# Patient Record
Sex: FEMALE | Race: WHITE | NOT HISPANIC OR LATINO | Employment: FULL TIME | URBAN - METROPOLITAN AREA
[De-identification: names, ages, dates, MRNs, and addresses within clinical notes are randomized per-mention and may not be internally consistent; named-entity substitution may affect disease eponyms.]

---

## 2024-01-11 RX ORDER — BACLOFEN 20 MG/1
20 TABLET ORAL 3 TIMES DAILY
Qty: 90 TABLET | Refills: 2 | Status: SHIPPED | OUTPATIENT
Start: 2024-01-11

## 2024-02-29 ENCOUNTER — PATIENT MESSAGE (OUTPATIENT)
Dept: OBSTETRICS AND GYNECOLOGY | Facility: CLINIC | Age: 49
End: 2024-02-29
Payer: COMMERCIAL

## 2024-03-04 RX ORDER — ESTRADIOL 0.04 MG/D
1 FILM, EXTENDED RELEASE TRANSDERMAL
Qty: 12 PATCH | Refills: 1 | OUTPATIENT
Start: 2024-03-04

## 2024-03-21 DIAGNOSIS — G47.00 INSOMNIA, UNSPECIFIED TYPE: ICD-10-CM

## 2024-03-21 NOTE — TELEPHONE ENCOUNTER
No care due was identified.  [facility] Embedded Care Due Messages. Reference number: [id number].   3/21/2024 5:08:28 PM CDT

## 2024-03-22 ENCOUNTER — PATIENT MESSAGE (OUTPATIENT)
Dept: FAMILY MEDICINE | Facility: CLINIC | Age: 49
End: 2024-03-22
Payer: COMMERCIAL

## 2024-03-22 ENCOUNTER — PATIENT MESSAGE (OUTPATIENT)
Dept: ADMINISTRATIVE | Facility: HOSPITAL | Age: 49
End: 2024-03-22
Payer: COMMERCIAL

## 2024-03-22 ENCOUNTER — PATIENT OUTREACH (OUTPATIENT)
Dept: ADMINISTRATIVE | Facility: HOSPITAL | Age: 49
End: 2024-03-22
Payer: COMMERCIAL

## 2024-03-22 RX ORDER — ZOLPIDEM TARTRATE 5 MG/1
5 TABLET ORAL NIGHTLY PRN
Qty: 30 TABLET | Refills: 2 | Status: SHIPPED | OUTPATIENT
Start: 2024-03-22 | End: 2024-05-21

## 2024-04-04 ENCOUNTER — PATIENT OUTREACH (OUTPATIENT)
Dept: ADMINISTRATIVE | Facility: HOSPITAL | Age: 49
End: 2024-04-04
Payer: COMMERCIAL

## 2024-04-04 DIAGNOSIS — Z12.11 SCREENING FOR COLORECTAL CANCER: Primary | ICD-10-CM

## 2024-04-04 DIAGNOSIS — Z12.12 SCREENING FOR COLORECTAL CANCER: Primary | ICD-10-CM

## 2024-04-08 ENCOUNTER — OFFICE VISIT (OUTPATIENT)
Dept: FAMILY MEDICINE | Facility: CLINIC | Age: 49
End: 2024-04-08
Payer: COMMERCIAL

## 2024-04-08 VITALS
OXYGEN SATURATION: 97 % | HEART RATE: 103 BPM | BODY MASS INDEX: 29.98 KG/M2 | TEMPERATURE: 99 F | SYSTOLIC BLOOD PRESSURE: 130 MMHG | DIASTOLIC BLOOD PRESSURE: 80 MMHG | WEIGHT: 163.94 LBS

## 2024-04-08 DIAGNOSIS — F41.9 ANXIETY: ICD-10-CM

## 2024-04-08 DIAGNOSIS — K21.9 GASTROESOPHAGEAL REFLUX DISEASE, UNSPECIFIED WHETHER ESOPHAGITIS PRESENT: ICD-10-CM

## 2024-04-08 DIAGNOSIS — M54.2 NECK PAIN: Primary | ICD-10-CM

## 2024-04-08 DIAGNOSIS — F17.200 SMOKER: Chronic | ICD-10-CM

## 2024-04-08 PROCEDURE — 3075F SYST BP GE 130 - 139MM HG: CPT | Mod: CPTII,S$GLB,, | Performed by: NURSE PRACTITIONER

## 2024-04-08 PROCEDURE — 3008F BODY MASS INDEX DOCD: CPT | Mod: CPTII,S$GLB,, | Performed by: NURSE PRACTITIONER

## 2024-04-08 PROCEDURE — 99999 PR PBB SHADOW E&M-EST. PATIENT-LVL IV: CPT | Mod: PBBFAC,,, | Performed by: NURSE PRACTITIONER

## 2024-04-08 PROCEDURE — 1159F MED LIST DOCD IN RCRD: CPT | Mod: CPTII,S$GLB,, | Performed by: NURSE PRACTITIONER

## 2024-04-08 PROCEDURE — 3079F DIAST BP 80-89 MM HG: CPT | Mod: CPTII,S$GLB,, | Performed by: NURSE PRACTITIONER

## 2024-04-08 PROCEDURE — 99214 OFFICE O/P EST MOD 30 MIN: CPT | Mod: S$GLB,,, | Performed by: NURSE PRACTITIONER

## 2024-04-08 RX ORDER — ESTRADIOL 0.04 MG/D
1 FILM, EXTENDED RELEASE TRANSDERMAL
Qty: 12 PATCH | Refills: 1 | Status: SHIPPED | OUTPATIENT
Start: 2024-04-08 | End: 2024-05-21 | Stop reason: ALTCHOICE

## 2024-04-08 RX ORDER — METHYLPREDNISOLONE 4 MG/1
TABLET ORAL
Qty: 21 EACH | Refills: 0 | Status: SHIPPED | OUTPATIENT
Start: 2024-04-08 | End: 2024-04-29

## 2024-04-08 NOTE — PROGRESS NOTES
HPI     Chief Complaint:  Chief Complaint   Patient presents with    Otalgia    Neck Pain       Sara Thapa is a 49 y.o. female with multiple medical diagnoses as listed in the medical history and problem list that presents for neck pain.  Pt is new to me but is known to this clinic with her last appointment being 10/20/2023.      Neck Pain   This is a new problem. Episode onset: 4/6/24. The problem occurs daily. The problem has been unchanged. The pain is associated with nothing. The pain is present in the anterior neck. The pain is at a severity of 8/10. Associated symptoms include pain with swallowing. Pertinent negatives include no chest pain, fever, headaches, numbness, photophobia, syncope, tingling, trouble swallowing, visual change or weakness. She has tried NSAIDs for the symptoms. The treatment provided mild relief.   Otalgia   There is pain in the right ear. This is a new problem. Episode onset: 4/6/24. There has been no fever. The pain is at a severity of 3/10. Associated symptoms include neck pain. Pertinent negatives include no coughing, diarrhea, ear discharge, headaches, rhinorrhea or vomiting. She has tried NSAIDs for the symptoms. The treatment provided moderate relief. There is no history of a chronic ear infection, hearing loss or a tympanostomy tube.     Denies difficulty swallowing. Denies travel, recent illness, similar symptoms in the past. Denies discoloration, use of new products or medications.         Assessment & Plan     Problem List Items Addressed This Visit          Psychiatric    Anxiety; sertraline didn't help    Encouraged the patient to perform self-calming techniques, such as deep breathing/relaxation techniques and exercise.         GI    GERD (gastroesophageal reflux disease)    -stable, discussed with patient about avoiding potential dietary triggers  -avoid spicy/greasy/sour/acidic foods, as well as tea/coffee/chocolate if possible  -Tylenol as needed for pain, avoid  NSAIDs  -Keep food diary         Other    Smoker (Chronic)    -Counseled patient to quit tobacco usage, and advised on several options to quit and the benefits of quitting, which include but are not limited to: decreasing the risk of cancer, HTN, CVD, respiratory complications, among other diseases.  -5 minutes spent discussing cessation.   -pt is interested in quitting.         Overview     8/22/2017 quit smoking 3 months ago           Other Visit Diagnoses       Neck pain    -  Primary    Physical exam is unremarkable. No lymphadenopathy. No erythema or bulging to TM.  Hx does not point to a specific dx.   Mild improvement with NSAIDs.     Start medrol  She has Mobic at [facility] advised to take prn with food  The patient was advised that NSAID-type medications have two very important potential side effects: gastrointestinal irritation including hemorrhage and renal injuries. Take the medication with food and to stop if  experiencing any GI upset. Call for vomiting, abdominal pain or black/bloody stools. The patient expresses understanding of these issues and questions were answered.    Discussed DDx, condition, and treatment.   Education sent to patient portal/included in after visit summary.  ED precautions given.   Notify provider if symptoms do not resolve or increase in severity.   Patient verbalizes understanding and agrees with plan of care.      Relevant Medications    methylPREDNISolone (MEDROL DOSEPACK) 4 mg tablet    Other Relevant Orders    T3    T4, Free    TSH    Comprehensive Metabolic Panel    CBC W/ AUTO DIFFERENTIAL            --------------------------------------------      Health Maintenance:  Health Maintenance         Date Due Completion Date    COVID-19 Vaccine (1) Never done ---    Pneumococcal Vaccines (Age 0-64) (1 of 2 - PCV) Never done ---    Shingles Vaccine (1 of 2) Never done ---    Mammogram 06/30/2023 6/30/2022    Colorectal Cancer Screening 07/06/2023 7/6/2022    TETANUS VACCINE  01/01/2024 1/1/2014    HIV Screening 10/19/2026 (Originally 4/5/1990) ---    Hemoglobin A1c (Diabetic Prevention Screening) 08/27/2025 8/27/2022    Cervical Cancer Screening 12/29/2025 12/29/2020    Lipid Panel 09/27/2028 9/27/2023            Discussed the importance of overdue vaccines which were offered during this encounter. Patient declined overdue vaccines at this time and Advised patient on the importance of completing overdue health maintenance items    Follow Up:  Follow up in about 2 weeks (around 4/22/2024), or if symptoms worsen or fail to improve.    Exam     Review of Systems:  (as noted above)  Review of Systems   Constitutional:  Negative for fever.   HENT:  Positive for ear pain. Negative for ear discharge, rhinorrhea and trouble swallowing.    Eyes:  Negative for photophobia and visual disturbance.   Respiratory:  Negative for cough, chest tightness and shortness of breath.    Cardiovascular:  Negative for chest pain and syncope.   Gastrointestinal:  Negative for blood in stool, diarrhea and vomiting.   Musculoskeletal:  Positive for neck pain.   Neurological:  Negative for tingling, weakness, numbness and headaches.       Physical Exam:   Physical Exam  Constitutional:       General: She is not in acute distress.     Appearance: She is not ill-appearing or diaphoretic.   HENT:      Head: Normocephalic and atraumatic.   Cardiovascular:      Rate and Rhythm: Normal rate and regular rhythm.      Heart sounds: No murmur heard.     No friction rub. No gallop.   Pulmonary:      Effort: No respiratory distress.   Chest:      Chest wall: No tenderness.   Musculoskeletal:      Cervical back: No rigidity.   Skin:     Capillary Refill: Capillary refill takes 2 to 3 seconds.   Neurological:      General: No focal deficit present.      Mental Status: She is alert and oriented to person, place, and time.       Vitals:    04/08/24 1645   BP: 130/80   BP Location: Right arm   Patient Position: Sitting   BP Method:  Medium (Manual)   Pulse: 103   Temp: 98.8 °F (37.1 °C)   TempSrc: Oral   SpO2: 97%   Weight: 74.3 kg (163 lb 14.6 oz)      Body mass index is 29.98 kg/m².        History     Past Medical History:  Past Medical History:   Diagnosis Date    Arthritis     Fibromyalgia     Hyperlipidemia     Insomnia        Past Surgical History:  Past Surgical History:   Procedure Laterality Date     SECTION      CYST REMOVAL      ENDOMETRIAL ABLATION      ESOPHAGOGASTRODUODENOSCOPY N/A 2020    Procedure: EGD (ESOPHAGOGASTRODUODENOSCOPY);  Surgeon: Domenica Grimes MD;  Location: [facility];  Service: Endoscopy;  Laterality: N/A;  order  per pt request, pt requested to have only egd done at this time-revot  covid test  Lapalco    TUBAL LIGATION         Social History:  Social History     Socioeconomic History    Marital status:    Occupational History    Occupation: courthouse runner   Tobacco Use    Smoking status: Every Day     Types: Vaping with nicotine     Start date: 2018    Smokeless tobacco: Never   Substance and Sexual Activity    Alcohol use: Yes     Comment: ocassionally perhaps weekly    Drug use: No    Sexual activity: Yes     Partners: Male     Birth control/protection: None   Social History Narrative    Together since     She is  . Lives with her  he does smoke cigarettes also lives with 1 of her children.    She used to be a court run her 3 years ago    She now works at a desk job as a      Social Determinants of Health     Financial Resource Strain: Patient Declined (2024)    Overall Financial Resource Strain (CARDIA)     Difficulty of Paying Living Expenses: Patient declined   Food Insecurity: Patient Declined (2024)    Hunger Vital Sign     Worried About Running Out of Food in the Last Year: Patient declined     Ran Out of Food in the Last Year: Patient declined   Transportation Needs: Patient Declined (2024)    PRAPARE -  Transportation     Lack of Transportation (Medical): Patient declined     Lack of Transportation (Non-Medical): Patient declined   Physical Activity: Unknown (4/8/2024)    Exercise Vital Sign     Days of Exercise per Week: Patient declined   Stress: Patient Declined (4/8/2024)    [place] [employer] - Occupational Stress Questionnaire     Feeling of Stress : Patient declined   Social Connections: Unknown (4/8/2024)    Social Connection and Isolation Panel [NHANES]     Frequency of Communication with Friends and Family: Patient declined     Frequency of Social Gatherings with Friends and Family: Patient declined     Active Member of Clubs or Organizations: Patient declined     Attends Club or Organization Meetings: Patient declined     Marital Status: Patient declined   Housing Stability: Patient Declined (4/8/2024)    Housing Stability Vital Sign     Unable to Pay for Housing in the Last Year: Patient declined     Unstable Housing in the Last Year: Patient declined       Family History:  Family History   Problem Relation Age of Onset    Stroke Mother     Cancer Father         colon    Hypertension Father     Colon cancer Father 60        Patient thinks he was about 60 years of age    No Known Problems Daughter     No Known Problems Son     No Known Problems Brother     Celiac disease Neg Hx     Cirrhosis Neg Hx     Colon polyps Neg Hx     Crohn's disease Neg Hx     Esophageal cancer Neg Hx     Inflammatory bowel disease Neg Hx     Liver cancer Neg Hx     Liver disease Neg Hx     Rectal cancer Neg Hx     Stomach cancer Neg Hx     Ulcerative colitis Neg Hx        Allergies and Medications: (updated and reviewed)  Review of patient's allergies indicates:   Allergen Reactions    Codeine Hives and Itching     Other reaction(s): Drug Allergy     Current Outpatient Medications   Medication Sig Dispense Refill    baclofen (LIORESAL) 20 MG tablet TAKE 1 TABLET(20 MG) BY MOUTH THREE TIMES DAILY 90 tablet  2    meloxicam (MOBIC) 7.5 MG tablet Take 1 tablet (7.5 mg total) by mouth once daily. 30 tablet 2    ondansetron (ZOFRAN-ODT) 4 MG TbDL DISSOLVE 1 TABLET(4 MG) ON THE TONGUE EVERY 6 HOURS AS NEEDED FOR NAUSEA 30 tablet 0    traMADoL (ULTRAM) 50 mg tablet Take 1 tablet (50 mg total) by mouth 3 (three) times daily as needed for Pain. 90 tablet 0    zolpidem (AMBIEN) 5 MG Tab Take 1 tablet (5 mg total) by mouth nightly as needed (insomnia). 30 tablet 2    estradioL (VIVELLE-DOT) 0.0375 mg/24 hr Place 1 patch onto the skin every 7 days. 12 patch 1    methylPREDNISolone (MEDROL DOSEPACK) 4 mg tablet use as directed 21 each 0     No current facility-administered medications for this visit.       Patient Care Team:  Montrell Magaña MD as PCP - General (Internal Medicine)  Keven Miller MD as Consulting Physician (Physical Medicine and Rehabilitation)  Jc Lowry MA as Care Coordinator         - The patient is given an After Visit Summary that lists all medications with directions, allergies, education, orders placed during this encounter and follow-up instructions.      - I have reviewed the patient's medical information including past medical, family, and social history sections including the medications and allergies.      - We discussed the patient's current medications.     This note was created by combination of typed  and MModal dictation.  Transcription errors may be present.  If there are any questions, please contact me.       Amos Harding NP

## 2024-04-08 NOTE — PATIENT INSTRUCTIONS
Medical Fitness--[phone]  Imaging, Xray, CT, MRI, Ultrasound---[phone]  Bariatrics---[phone]  Breast Surgery---[phone]  Case Management---[phone]  Colonoscopy---[phone]  DME---[phone]  Infectious Disease---[phone]  Interventional Radiology---[phone]  Medical Records---[phone]  [facility] On [facility]---[phone]  Optometry/Ophthalmology---[phone]  [facility]---[phone]  Physical Therapy---[phone]  Psychiatry---[phone] or [phone]  Plastic Surgery---[phone]  Recovery--[phone] option 2, or [phone].  Sleep Study---[phone]  Smoking Cessation---[phone]  Wound Care---[phone]  Referral Desk---[phone]

## 2024-04-09 ENCOUNTER — LAB VISIT (OUTPATIENT)
Dept: LAB | Facility: HOSPITAL | Age: 49
End: 2024-04-09
Payer: COMMERCIAL

## 2024-04-09 DIAGNOSIS — M54.2 NECK PAIN: ICD-10-CM

## 2024-04-09 LAB
ALBUMIN SERPL BCP-MCNC: 4.3 G/DL (ref 3.5–5.2)
ALP SERPL-CCNC: 99 U/L (ref 55–135)
ALT SERPL W/O P-5'-P-CCNC: 43 U/L (ref 10–44)
ANION GAP SERPL CALC-SCNC: 7 MMOL/L (ref 8–16)
AST SERPL-CCNC: 28 U/L (ref 10–40)
BASOPHILS # BLD AUTO: 0.03 K/UL (ref 0–0.2)
BASOPHILS NFR BLD: 0.3 % (ref 0–1.9)
BILIRUB SERPL-MCNC: 0.4 MG/DL (ref 0.1–1)
BUN SERPL-MCNC: 20 MG/DL (ref 6–20)
CALCIUM SERPL-MCNC: 10.1 MG/DL (ref 8.7–10.5)
CHLORIDE SERPL-SCNC: 107 MMOL/L (ref 95–110)
CO2 SERPL-SCNC: 25 MMOL/L (ref 23–29)
CREAT SERPL-MCNC: 0.9 MG/DL (ref 0.5–1.4)
DIFFERENTIAL METHOD BLD: ABNORMAL
EOSINOPHIL # BLD AUTO: 0 K/UL (ref 0–0.5)
EOSINOPHIL NFR BLD: 0.3 % (ref 0–8)
ERYTHROCYTE [DISTWIDTH] IN BLOOD BY AUTOMATED COUNT: 11.6 % (ref 11.5–14.5)
EST. GFR  (NO RACE VARIABLE): >60 ML/MIN/1.73 M^2
GLUCOSE SERPL-MCNC: 105 MG/DL (ref 70–110)
HCT VFR BLD AUTO: 40.1 % (ref 37–48.5)
HGB BLD-MCNC: 13.3 G/DL (ref 12–16)
IMM GRANULOCYTES # BLD AUTO: 0.04 K/UL (ref 0–0.04)
IMM GRANULOCYTES NFR BLD AUTO: 0.4 % (ref 0–0.5)
LYMPHOCYTES # BLD AUTO: 1.5 K/UL (ref 1–4.8)
LYMPHOCYTES NFR BLD: 15.3 % (ref 18–48)
MCH RBC QN AUTO: 31.4 PG (ref 27–31)
MCHC RBC AUTO-ENTMCNC: 33.2 G/DL (ref 32–36)
MCV RBC AUTO: 95 FL (ref 82–98)
MONOCYTES # BLD AUTO: 0.3 K/UL (ref 0.3–1)
MONOCYTES NFR BLD: 2.9 % (ref 4–15)
NEUTROPHILS # BLD AUTO: 8 K/UL (ref 1.8–7.7)
NEUTROPHILS NFR BLD: 80.8 % (ref 38–73)
NRBC BLD-RTO: 0 /100 WBC
PLATELET # BLD AUTO: 294 K/UL (ref 150–450)
PMV BLD AUTO: 10.5 FL (ref 9.2–12.9)
POTASSIUM SERPL-SCNC: 4.9 MMOL/L (ref 3.5–5.1)
PROT SERPL-MCNC: 7.7 G/DL (ref 6–8.4)
RBC # BLD AUTO: 4.23 M/UL (ref 4–5.4)
SODIUM SERPL-SCNC: 139 MMOL/L (ref 136–145)
T3 SERPL-MCNC: 101 NG/DL (ref 60–180)
T4 FREE SERPL-MCNC: 0.93 NG/DL (ref 0.71–1.51)
TSH SERPL DL<=0.005 MIU/L-ACNC: 0.99 UIU/ML (ref 0.4–4)
WBC # BLD AUTO: 9.86 K/UL (ref 3.9–12.7)

## 2024-04-09 PROCEDURE — 84480 ASSAY TRIIODOTHYRONINE (T3): CPT | Performed by: NURSE PRACTITIONER

## 2024-04-09 PROCEDURE — 80053 COMPREHEN METABOLIC PANEL: CPT | Performed by: NURSE PRACTITIONER

## 2024-04-09 PROCEDURE — 84443 ASSAY THYROID STIM HORMONE: CPT | Performed by: NURSE PRACTITIONER

## 2024-04-09 PROCEDURE — 85025 COMPLETE CBC W/AUTO DIFF WBC: CPT | Performed by: NURSE PRACTITIONER

## 2024-04-09 PROCEDURE — 84439 ASSAY OF FREE THYROXINE: CPT | Performed by: NURSE PRACTITIONER

## 2024-04-09 PROCEDURE — 36415 COLL VENOUS BLD VENIPUNCTURE: CPT | Mod: PO | Performed by: NURSE PRACTITIONER

## 2024-04-10 ENCOUNTER — PATIENT MESSAGE (OUTPATIENT)
Dept: FAMILY MEDICINE | Facility: CLINIC | Age: 49
End: 2024-04-10
Payer: COMMERCIAL

## 2024-05-16 ENCOUNTER — LAB VISIT (OUTPATIENT)
Dept: LAB | Facility: HOSPITAL | Age: 49
End: 2024-05-16
Attending: INTERNAL MEDICINE
Payer: COMMERCIAL

## 2024-05-16 DIAGNOSIS — Z12.12 SCREENING FOR COLORECTAL CANCER: ICD-10-CM

## 2024-05-16 DIAGNOSIS — Z12.11 SCREENING FOR COLORECTAL CANCER: ICD-10-CM

## 2024-05-16 PROCEDURE — 82274 ASSAY TEST FOR BLOOD FECAL: CPT | Performed by: INTERNAL MEDICINE

## 2024-05-21 ENCOUNTER — PATIENT MESSAGE (OUTPATIENT)
Dept: FAMILY MEDICINE | Facility: CLINIC | Age: 49
End: 2024-05-21
Payer: COMMERCIAL

## 2024-05-21 ENCOUNTER — E-VISIT (OUTPATIENT)
Dept: FAMILY MEDICINE | Facility: CLINIC | Age: 49
End: 2024-05-21
Payer: COMMERCIAL

## 2024-05-21 DIAGNOSIS — M47.816 LUMBAR SPONDYLOSIS: ICD-10-CM

## 2024-05-21 DIAGNOSIS — G47.00 INSOMNIA, UNSPECIFIED TYPE: Primary | Chronic | ICD-10-CM

## 2024-05-21 LAB — HEMOCCULT STL QL IA: NEGATIVE

## 2024-05-21 PROCEDURE — 99422 OL DIG E/M SVC 11-20 MIN: CPT | Mod: ,,, | Performed by: INTERNAL MEDICINE

## 2024-05-21 RX ORDER — ZOLPIDEM TARTRATE 6.25 MG/1
6.25 TABLET, FILM COATED, EXTENDED RELEASE ORAL NIGHTLY PRN
Qty: 30 TABLET | Refills: 2 | Status: SHIPPED | OUTPATIENT
Start: 2024-05-21 | End: 2024-05-27 | Stop reason: SINTOL

## 2024-05-21 NOTE — ASSESSMENT & PLAN NOTE
5/21/24: had previously tried weaning off of Ambien without relief. No withdrawal but has a chronic baseline insomnia. Finds that intolerable as she can not function the following day at work.  I have tried a number of other medications which either were not effective or were expensive  Currently on regular release Ambien 5 mg with incomplete control.  She is requesting to revert back to previous Ambien CR.  She is aware of the high-risk nature of this medication.  Will revert back to Ambien CR 6.25.

## 2024-05-21 NOTE — PROGRESS NOTES
Patient ID: Sara Thapa is a 49 y.o. female.    This note was created by combination of typed  and M-Modal dictation.  Transcription errors may be present.  If there are any questions, please contact me.    Assessment and Plan:   Insomnia, unspecified type  Assessment & Plan:  5/21/24: had previously tried weaning off of Ambien without relief. No withdrawal but has a chronic baseline insomnia. Finds that intolerable as she can not function the following day at work.  I have tried a number of other medications which either were not effective or were expensive  Currently on regular release Ambien 5 mg with incomplete control.  She is requesting to revert back to previous Ambien CR.  She is aware of the high-risk nature of this medication.  Will revert back to Ambien CR 6.25.    Orders:  -     zolpidem (AMBIEN CR) 6.25 MG CR tablet; Take 1 tablet (6.25 mg total) by mouth nightly as needed for Insomnia.  Dispense: 30 tablet; Refill: 2    Lumbar spondylosis  Assessment & Plan:  Seeing Dr. Banuelos             No orders of the defined types were placed in this encounter.     Medications Ordered This Encounter   Medications    zolpidem (AMBIEN CR) 6.25 MG CR tablet     Sig: Take 1 tablet (6.25 mg total) by mouth nightly as needed for Insomnia.     Dispense:  30 tablet     Refill:  2        No follow-ups on file. or sooner as needed.        E-Visit Time Tracking:    Day 1, 11 min               274}    Follow Up: No follow-ups on file.    Subjective:   Patient ID: Sara Thapa is a 49 y.o. female.    Chief Complaint: Medication Management (Entered automatically based on patient selection in Patient Portal.)                    274}    History of Present Illness:  The patient initiated a request through [employer] on 5/21/2024 for evaluation and management with a chief complaint of Medication Management (Entered automatically based on patient selection in Patient Portal.)     Elevated creatinine, renal  ultrasound, trial of reduced meloxicam and reducing HCTZ   Macrocytic anemia denies alcohol use.  MMA normal  Pedal edema reduced dose of HCTZ   Dyslipidemia intolerant to statin and to zetia - burning sensation. Declined referral to cardiology.   Fibromyalgia intolerant to Savella.  On Lyrica.  Followed by PM& R.    Insomnia had previously tried weaning off of Ambien without relief. No withdrawal but has a chronic baseline insomnia. Finds that intolerable as she can not function the following day at work.  I have tried a number of other medications which either were not effective or were expensive    Last visit 10/2023  Insomnia. notes historically chronic insomnia, was started on Ambien, became dependent, did not really have withdrawal after stopping but return of baseline insomnia.  I have tried numerous other medications with either side effects or ineffectiveness.  Including trazodone, mirtazapine, doxepin.    Currently on Ambien 5 mg which represents a significant improvement for her as she has taken Ambien CR 12.5 and Ambien 10 in the past.    Discussed medications such as suvorexant as a possible alternative in the future but for now no changes, Ambien.  Chronic back pain and fibromyalgia. Previously saw PMR, never pain clinic.  Referred.    Saw outside neurosurgery Dr. Banuelos per claims data    Saw NP 4/8/24.  Neck pain.  TFTs ordered, normal. Steroid with SE of facial flushing.     Message from pt:  I am back to not getting much rest again. I am waking up around 4 hours after falling asleep. Can we go back to the cr? Im exhausted from tossing and turning all night. Very restless nights for the past two months. Its still working but not lasting long enough.     I evaluated the questionnaire submission on 05/21/2024 .    E-visit Questionnaire:  Ohs Peq Evisit Medication    5/21/2024  4:13 PM CDT - Filed by Patient   Do you agree to participate in an E-Visit? Yes   If you have any of the following symptoms,  please present to your local emergency room or call 911:  I acknowledge   Medication requests for narcotics will not be addressed via an E-Visit.  Please schedule an appointment. I acknowledge   Are you pregnant, could you be pregnant, or are you breast feeding? None of the above   Do you want to address a new or existing medication? I would like to address a medication I currently take   What is the main issue you would like addressed today? Change of dosage for medication currently taking   Would you like to change or continue your medication? Change medication   What medication would you like changed?  Ambien   What is your current dose? 5 MG   How often do you take your medication? 1 time daily   Why do you need the medication changed? Medication does not work     Ohs Peq Evisit Medication Branch Ineffective    5/21/2024  4:13 PM CDT - Filed by Patient   What problem was the medication supposed to address? Sleep   What effect has your medication had on your problem? Less than expected    What medical condition is the  medication intended to treat? Sleep   Provide any additional information you feel is important. Medication is not lasting   Please attach any relevant images or files    Are you able to take your vital signs? No          Problem List:  Active Problem List with Overview Notes    Diagnosis Date Noted    GERD (gastroesophageal reflux disease) 12/31/2020    Fibromyalgia 08/27/2020    Plantar fasciitis, bilateral 11/09/2018    Medial epicondylitis of elbow, right 11/09/2018    Biceps tendinitis on right 11/09/2018    Left lateral epicondylitis 11/09/2018    Early menopause 10/2018 FSH high in the 80s 10/31/2018    Anxiety; sertraline didn't help 10/06/2017    Pedal edema 04/13/2017    Smoker 04/13/2017 8/22/2017 quit smoking 3 months ago       Mixed hyperlipidemia atorva, rosuva SE of foul taste; trial of zetia 01/13/2017    Insomnia 10/13/2016     Hx of trazodone without efficacy; Lunesta with SE;  mirtazapine with SE; doxepin expensive/ineffective at low dose.          Recent Labs Obtained:  Lab Visit on 05/16/2024   Component Date Value Ref Range Status    Fecal Immunochemical Test (iFOBT) 05/16/2024 Negative  Negative Final         ALLERGIES AND MEDICATIONS: updated and reviewed.  Review of patient's allergies indicates:   Allergen Reactions    Codeine Hives and Itching     Other reaction(s): Drug Allergy       Medication List with Changes/Refills   Current Medications    BACLOFEN (LIORESAL) 20 MG TABLET    TAKE 1 TABLET(20 MG) BY MOUTH THREE TIMES DAILY    ESTRADIOL (VIVELLE-DOT) 0.0375 MG/24 HR    Place 1 patch onto the skin every 7 days.    MELOXICAM (MOBIC) 7.5 MG TABLET    Take 1 tablet (7.5 mg total) by mouth once daily.    ONDANSETRON (ZOFRAN-ODT) 4 MG TBDL    DISSOLVE 1 TABLET(4 MG) ON THE TONGUE EVERY 6 HOURS AS NEEDED FOR NAUSEA    TRAMADOL (ULTRAM) 50 MG TABLET    Take 1 tablet (50 mg total) by mouth 3 (three) times daily as needed for Pain.    ZOLPIDEM (AMBIEN) 5 MG TAB    Take 1 tablet (5 mg total) by mouth nightly as needed (insomnia).

## 2024-05-27 RX ORDER — ZOLPIDEM TARTRATE 10 MG/1
10 TABLET ORAL NIGHTLY PRN
Qty: 30 TABLET | Refills: 2 | Status: SHIPPED | OUTPATIENT
Start: 2024-05-27 | End: 2024-05-28 | Stop reason: SDUPTHER

## 2024-05-28 RX ORDER — ZOLPIDEM TARTRATE 10 MG/1
10 TABLET ORAL NIGHTLY PRN
Qty: 30 TABLET | Refills: 2 | Status: SHIPPED | OUTPATIENT
Start: 2024-05-28 | End: 2024-05-28 | Stop reason: SDUPTHER

## 2024-05-28 RX ORDER — ZOLPIDEM TARTRATE 10 MG/1
10 TABLET ORAL NIGHTLY PRN
Qty: 30 TABLET | Refills: 2 | Status: SHIPPED | OUTPATIENT
Start: 2024-05-28

## 2024-06-28 ENCOUNTER — TELEPHONE (OUTPATIENT)
Dept: HEPATOLOGY | Facility: CLINIC | Age: 49
End: 2024-06-28
Payer: COMMERCIAL

## 2024-06-28 NOTE — TELEPHONE ENCOUNTER
----- Message from Carolyn Arzola sent at 6/28/2024  3:12 PM CDT -----  Regarding: Prior Authorization  Contact: Patient  Patient is requesting a call back in reference to medication   Patient stated she was informed by pharmacy a prior authorization is needed   zolpidem (AMBIEN) 10 mg Tab  [id number] - [place] - [address] AT [address]   Phone: [phone]  Please Assist     Patient can be reached at  [phone]

## 2024-06-28 NOTE — TELEPHONE ENCOUNTER
Spoke with pt, states needs PA for Ambien . Not seeing anything in system with a request. Advised someone will check and call her back on Monday

## 2024-08-22 DIAGNOSIS — G47.00 INSOMNIA, UNSPECIFIED TYPE: Chronic | ICD-10-CM

## 2024-08-22 RX ORDER — ZOLPIDEM TARTRATE 10 MG/1
10 TABLET ORAL NIGHTLY PRN
Qty: 30 TABLET | Refills: 2 | Status: SHIPPED | OUTPATIENT
Start: 2024-08-22

## 2024-08-22 NOTE — TELEPHONE ENCOUNTER
No care due was identified.  [facility] Embedded Care Due Messages. Reference number: [id number].   8/22/2024 10:46:22 AM CDT

## 2024-10-04 ENCOUNTER — PATIENT MESSAGE (OUTPATIENT)
Dept: ADMINISTRATIVE | Facility: HOSPITAL | Age: 49
End: 2024-10-04
Payer: COMMERCIAL

## 2024-11-11 ENCOUNTER — PATIENT MESSAGE (OUTPATIENT)
Dept: ADMINISTRATIVE | Facility: HOSPITAL | Age: 49
End: 2024-11-11
Payer: COMMERCIAL

## 2024-11-11 DIAGNOSIS — G47.00 INSOMNIA, UNSPECIFIED TYPE: Chronic | ICD-10-CM

## 2024-11-11 RX ORDER — ZOLPIDEM TARTRATE 10 MG/1
10 TABLET ORAL NIGHTLY PRN
Qty: 30 TABLET | Refills: 2 | Status: SHIPPED | OUTPATIENT
Start: 2024-11-11

## 2024-11-11 NOTE — TELEPHONE ENCOUNTER
No care due was identified.  [facility] Embedded Care Due Messages. Reference number: [id number].   11/11/2024 12:28:47 PM CST

## 2024-11-19 ENCOUNTER — PATIENT MESSAGE (OUTPATIENT)
Dept: ADMINISTRATIVE | Facility: HOSPITAL | Age: 49
End: 2024-11-19
Payer: COMMERCIAL

## 2025-02-08 DIAGNOSIS — G47.00 INSOMNIA, UNSPECIFIED TYPE: Chronic | ICD-10-CM

## 2025-02-08 NOTE — TELEPHONE ENCOUNTER
No care due was identified.  [facility] Embedded Care Due Messages. Reference number: [id number].   2/08/2025 12:40:42 PM CST

## 2025-02-09 RX ORDER — ZOLPIDEM TARTRATE 10 MG/1
10 TABLET ORAL NIGHTLY PRN
Qty: 30 TABLET | Refills: 2 | Status: SHIPPED | OUTPATIENT
Start: 2025-02-09

## 2025-02-13 ENCOUNTER — PATIENT MESSAGE (OUTPATIENT)
Dept: FAMILY MEDICINE | Facility: CLINIC | Age: 50
End: 2025-02-13
Payer: COMMERCIAL

## 2025-02-13 DIAGNOSIS — G47.00 INSOMNIA, UNSPECIFIED TYPE: Chronic | ICD-10-CM

## 2025-04-11 ENCOUNTER — PATIENT OUTREACH (OUTPATIENT)
Dept: ADMINISTRATIVE | Facility: HOSPITAL | Age: 50
End: 2025-04-11
Payer: COMMERCIAL

## 2025-05-07 ENCOUNTER — TELEPHONE (OUTPATIENT)
Dept: FAMILY MEDICINE | Facility: CLINIC | Age: 50
End: 2025-05-07
Payer: COMMERCIAL

## 2025-05-07 DIAGNOSIS — Z00.00 NORMAL PHYSICAL EXAM: Primary | ICD-10-CM

## 2025-05-07 DIAGNOSIS — G47.00 INSOMNIA, UNSPECIFIED TYPE: Chronic | ICD-10-CM

## 2025-05-07 RX ORDER — ZOLPIDEM TARTRATE 10 MG/1
10 TABLET ORAL NIGHTLY PRN
Qty: 30 TABLET | Refills: 0 | Status: SHIPPED | OUTPATIENT
Start: 2025-05-07

## 2025-05-07 NOTE — TELEPHONE ENCOUNTER
Spoke with pt, advised that med refilled, but needs annual appt. Patient driving will call back. Very busy with trials and recently lost her father.

## 2025-05-07 NOTE — TELEPHONE ENCOUNTER
No care due was identified.  [facility] Embedded Care Due Messages. Reference number: [id number].   5/07/2025 1:54:37 PM CDT

## 2025-05-28 DIAGNOSIS — D17.9 LIPOMA, UNSPECIFIED SITE: Primary | ICD-10-CM

## 2025-06-07 DIAGNOSIS — G47.00 INSOMNIA, UNSPECIFIED TYPE: Chronic | ICD-10-CM

## 2025-06-07 NOTE — TELEPHONE ENCOUNTER
No care due was identified.  [facility] Embedded Care Due Messages. Reference number: [id number].   6/07/2025 11:21:43 AM CDT

## 2025-06-08 RX ORDER — ZOLPIDEM TARTRATE 10 MG/1
10 TABLET ORAL NIGHTLY PRN
Qty: 30 TABLET | Refills: 0 | Status: SHIPPED | OUTPATIENT
Start: 2025-06-08

## 2025-06-12 ENCOUNTER — TELEPHONE (OUTPATIENT)
Dept: SURGERY | Facility: CLINIC | Age: 50
End: 2025-06-12
Payer: COMMERCIAL

## 2025-06-12 NOTE — TELEPHONE ENCOUNTER
Spoke with pt,  advised her that she can contact the office when ready to have her lipoma removed. Pt verbalized understanding.   
26-Apr-2017 16:48

## 2025-06-13 ENCOUNTER — LAB VISIT (OUTPATIENT)
Dept: LAB | Facility: HOSPITAL | Age: 50
End: 2025-06-13
Attending: INTERNAL MEDICINE
Payer: COMMERCIAL

## 2025-06-13 ENCOUNTER — OFFICE VISIT (OUTPATIENT)
Dept: FAMILY MEDICINE | Facility: CLINIC | Age: 50
End: 2025-06-13
Payer: COMMERCIAL

## 2025-06-13 VITALS
SYSTOLIC BLOOD PRESSURE: 150 MMHG | OXYGEN SATURATION: 98 % | DIASTOLIC BLOOD PRESSURE: 80 MMHG | WEIGHT: 156.5 LBS | HEART RATE: 92 BPM | BODY MASS INDEX: 28.8 KG/M2 | HEIGHT: 62 IN | TEMPERATURE: 99 F

## 2025-06-13 DIAGNOSIS — Z12.12 SCREENING FOR COLORECTAL CANCER: ICD-10-CM

## 2025-06-13 DIAGNOSIS — Z00.00 NORMAL PHYSICAL EXAM: ICD-10-CM

## 2025-06-13 DIAGNOSIS — Z80.0 FAMILY HISTORY OF COLON CANCER IN FATHER: ICD-10-CM

## 2025-06-13 DIAGNOSIS — E78.2 MIXED HYPERLIPIDEMIA: ICD-10-CM

## 2025-06-13 DIAGNOSIS — F41.9 ANXIETY: ICD-10-CM

## 2025-06-13 DIAGNOSIS — M79.7 FIBROMYALGIA: ICD-10-CM

## 2025-06-13 DIAGNOSIS — Z12.11 SCREENING FOR COLORECTAL CANCER: ICD-10-CM

## 2025-06-13 DIAGNOSIS — G47.00 INSOMNIA, UNSPECIFIED TYPE: Chronic | ICD-10-CM

## 2025-06-13 DIAGNOSIS — R03.0 ELEVATED BLOOD PRESSURE READING: ICD-10-CM

## 2025-06-13 DIAGNOSIS — Z00.00 NORMAL PHYSICAL EXAM: Primary | ICD-10-CM

## 2025-06-13 LAB
ALBUMIN SERPL BCP-MCNC: 4.4 G/DL (ref 3.5–5.2)
ALP SERPL-CCNC: 92 UNIT/L (ref 40–150)
ALT SERPL W/O P-5'-P-CCNC: 29 UNIT/L (ref 10–44)
ANION GAP (OHS): 9 MMOL/L (ref 8–16)
AST SERPL-CCNC: 21 UNIT/L (ref 11–45)
BILIRUB SERPL-MCNC: 0.2 MG/DL (ref 0.1–1)
BUN SERPL-MCNC: 19 MG/DL (ref 6–20)
CALCIUM SERPL-MCNC: 9.1 MG/DL (ref 8.7–10.5)
CHLORIDE SERPL-SCNC: 109 MMOL/L (ref 95–110)
CHOLEST SERPL-MCNC: 281 MG/DL (ref 120–199)
CHOLEST/HDLC SERPL: 5.1 {RATIO} (ref 2–5)
CO2 SERPL-SCNC: 24 MMOL/L (ref 23–29)
CREAT SERPL-MCNC: 0.8 MG/DL (ref 0.5–1.4)
EAG (OHS): 105 MG/DL (ref 68–131)
ERYTHROCYTE [DISTWIDTH] IN BLOOD BY AUTOMATED COUNT: 11.8 % (ref 11.5–14.5)
GFR SERPLBLD CREATININE-BSD FMLA CKD-EPI: >60 ML/MIN/1.73/M2
GLUCOSE SERPL-MCNC: 84 MG/DL (ref 70–110)
HBA1C MFR BLD: 5.3 % (ref 4–5.6)
HCT VFR BLD AUTO: 35.4 % (ref 37–48.5)
HDLC SERPL-MCNC: 55 MG/DL (ref 40–75)
HDLC SERPL: 19.6 % (ref 20–50)
HGB BLD-MCNC: 12.2 GM/DL (ref 12–16)
LDLC SERPL CALC-MCNC: 201.2 MG/DL (ref 63–159)
MCH RBC QN AUTO: 32.6 PG (ref 27–31)
MCHC RBC AUTO-ENTMCNC: 34.5 G/DL (ref 32–36)
MCV RBC AUTO: 95 FL (ref 82–98)
NONHDLC SERPL-MCNC: 226 MG/DL
PLATELET # BLD AUTO: 279 K/UL (ref 150–450)
PMV BLD AUTO: 10.4 FL (ref 9.2–12.9)
POTASSIUM SERPL-SCNC: 3.9 MMOL/L (ref 3.5–5.1)
PROT SERPL-MCNC: 7.7 GM/DL (ref 6–8.4)
RBC # BLD AUTO: 3.74 M/UL (ref 4–5.4)
SODIUM SERPL-SCNC: 142 MMOL/L (ref 136–145)
TRIGL SERPL-MCNC: 124 MG/DL (ref 30–150)
WBC # BLD AUTO: 9.9 K/UL (ref 3.9–12.7)

## 2025-06-13 PROCEDURE — 80061 LIPID PANEL: CPT

## 2025-06-13 PROCEDURE — 36415 COLL VENOUS BLD VENIPUNCTURE: CPT | Mod: PO

## 2025-06-13 PROCEDURE — 85027 COMPLETE CBC AUTOMATED: CPT

## 2025-06-13 PROCEDURE — 83036 HEMOGLOBIN GLYCOSYLATED A1C: CPT

## 2025-06-13 PROCEDURE — 80053 COMPREHEN METABOLIC PANEL: CPT

## 2025-06-13 PROCEDURE — 99999 PR PBB SHADOW E&M-EST. PATIENT-LVL IV: CPT | Mod: PBBFAC,,, | Performed by: INTERNAL MEDICINE

## 2025-06-13 RX ORDER — LIDOCAINE 50 MG/G
1 PATCH TOPICAL
COMMUNITY
Start: 2025-05-27

## 2025-06-13 NOTE — ASSESSMENT & PLAN NOTE
had previously tried weaning off of Ambien without relief. No withdrawal but has a chronic baseline insomnia. Finds that intolerable as she can not function the following day at work.  I have tried a number of other medications which either were not effective or were expensive  Had adverse reaction to Ambien CR most recently so currently on Ambien regular release.

## 2025-06-13 NOTE — PATIENT INSTRUCTIONS
PLEASE CHECK BP REGULARLY    GOAL IS TO BE CONSISTENTLY BELOW 140/90    IF ONE OR THE OTHER IS ALWAYS HIGH, SEND MESSAGE    WOULD PROBABLY NEED TO INITIATE MEDICATION.

## 2025-06-13 NOTE — ASSESSMENT & PLAN NOTE
Intolerant previously to statins and Zetia.  Aware of the need for diet and physical activity modification

## 2025-06-13 NOTE — ASSESSMENT & PLAN NOTE
Recent procedural intervention in the neck as well as L-spine with pain clinic.  Still recovering from that.

## 2025-06-13 NOTE — PROGRESS NOTES
This note was created by combination of typed  and M-Modal dictation.  Transcription errors may be present.   This note was also generated with the assistance of ambient listening technology. Verbal consent was obtained by the patient and accompanying visitor(s) for the recording of patient appointment to facilitate this note. I attest to having reviewed and edited the generated note for accuracy, though some syntax or spelling errors may persist. Please contact the author of this note for any clarification.    Assessment and Plan:   Assessment and Plan    Assessment & Plan  Normal physical exam  Family history of colon cancer in father  Screening for colorectal cancer  Previously attempted colon prep, unable to tolerate with incomplete prep.  She is not willing to pursue again.  Does have a family history of colon cancer with father with colon cancer in his 60s.  She would be agreeable for fit kit with the understanding that if it is abnormal would need to pursue colonoscopy.  They do have Suprep tabs now which may make it more tolerable.  FitKit was given to patient on 6/13/2025 2:34 PM   Declines mammogram.  Found it quite painful.  She would be agreeable for ultrasound but those are not covered/indicated for screening for breast cancer  She declines vaccinations today  Orders:    Fecal Immunochemical Test (iFOBT); Future    Elevated blood pressure reading  Was found to have elevated BP around the time of her interventions with pain clinic.  She purchased a home monitor and was concerned about an initial reading of 180/100.  Stay in the office it is not quite as severe 150/80 bilaterally.    No previous diagnosis of hypertension.    I have asked her to continue to monitor.  May improve as the steroid injections wear off.  If remains above 140/90 I have asked her to reach out to me which case I would start olmesartan       Insomnia, unspecified type  had previously tried weaning off of Ambien without  relief. No withdrawal but has a chronic baseline insomnia. Finds that intolerable as she can not function the following day at work.  I have tried a number of other medications which either were not effective or were expensive  Had adverse reaction to Ambien CR most recently so currently on Ambien regular release.        Fibromyalgia  Recent procedural intervention in the neck as well as L-spine with pain clinic.  Still recovering from that.       Anxiety; sertraline didn't help  Stable, not on medication at this time       Mixed hyperlipidemia atorva, rosuva SE of foul taste; trial of zetia  Intolerant previously to statins and Zetia.  Aware of the need for diet and physical activity modification           There are no discontinued medications.    meds sent this encounter:         Follow Up:   Future Appointments   Date Time Provider Department Center   6/13/2025  3:45 PM [facility]         Subjective:   Subjective   Chief Complaint   Patient presents with    Annual Exam     Increased blood pressure during home measurement.       ROSHAN Ruiz is a 50 y.o. female.    Social History     Socioeconomic History    Marital status:    Occupational History    Occupation: courthouse runner   Tobacco Use    Smoking status: Every Day     Types: Vaping with nicotine     Start date: 7/4/2018    Smokeless tobacco: Never   Substance and Sexual Activity    Alcohol use: Yes     Comment: ocassionally perhaps weekly    Drug use: No    Sexual activity: Yes     Partners: Male     Birth control/protection: None   Social History Narrative    Together since     She is  2013. Lives with her  he does smoke cigarettes also lives with 1 of her children.    She used to be a court run her 3 years ago    She now works at a desk job as a      Social Drivers of Health     Financial Resource Strain: Low Risk  (6/12/2025)    Overall Financial Resource Strain (CARDIA)     Difficulty of Paying Living  Expenses: Not hard at all   Food Insecurity: No Food Insecurity (6/12/2025)    Hunger Vital Sign     Worried About Running Out of Food in the Last Year: Never true     Ran Out of Food in the Last Year: Never true   Transportation Needs: No Transportation Needs (6/12/2025)    PRAPARE - Transportation     Lack of Transportation (Medical): No     Lack of Transportation (Non-Medical): No   Physical Activity: Unknown (6/12/2025)    Exercise Vital Sign     Days of Exercise per Week: 0 days     Minutes of Exercise per Session: Patient declined   Stress: Stress Concern Present (6/12/2025)    [employer] - Occupational Stress Questionnaire     Feeling of Stress : To some extent   Housing Stability: Low Risk  (6/12/2025)    Housing Stability Vital Sign     Unable to Pay for Housing in the Last Year: No     Number of Times Moved in the Last Year: 0     Homeless in the Last Year: No       No LMP recorded. Patient has had an ablation.    Last appointment with this clinic was Visit date not found. Last visit with me 5/21/2024   To summarize last visit and events leading up to today:  Pedal edema reduced dose of HCTZ; HCTZ stopped 2022  Dyslipidemia intolerant to statin and to zetia - burning sensation. Declined referral to cardiology.   Macrocytic anemia denies alcohol use.  MMA normal; resolved on repeat 04/09/2024  Fibromyalgia intolerant to Savella.  On Lyrica.  Followed by PM& R.    Most recent visit with her PMR 05/23/2025.  Underwent cervical epidural steroid injection, lumbar medial branch rhizotomy for bilateral L4-L5/L5-S1 levels 04/09/2025.  Initial relief from the cervical epidural 50% relief for the medial branch block.  Plan is TF LAW left C7 nerve root level and lumbar spinal nerve transection for bilateral L4-L5 L5-S1 levels.    Insomnia had previously tried weaning off of Ambien without relief. No withdrawal but has a chronic baseline insomnia. Finds that intolerable as she can not  function the following day at work.  I have tried a number of other medications including trazodone, mirtazapine, doxepin, which either were not effective or were expensive    Last visit 2024 E visit for insomnia.  Previously tried weaning off Ambien without success.  No withdrawal symptoms but has chronic baseline insomnia and finds it intolerable.  At that time on regular release Ambien and requesting to revert back to Ambien CR.    Subsequently messaged the Ambien CR caused heart fluttering sensations.  Revert back to regular release Ambien 10 mg    Most recent visit with her PMR 2025.  Underwent cervical epidural steroid injection, lumbar medial branch rhizotomy for bilateral L4-L5/L5-S1 levels 2025.  Initial relief from the cervical epidural 50% relief for the medial branch block.  Plan is TF LAW left C7 nerve root level and lumbar spinal nerve transection for bilateral L4-L5 L5-S1 levels.  Lipoma, referred to general surgery    Today's visit:    History of Present Illness    SOCIAL HISTORY:  - Occupation: Sits at a desk all day    HPI:  Sara reports elevated blood pressure readings over the past 2 months, coinciding with recent neck and back procedures. During these procedures, her blood pressure was significantly elevated. She has been tracking her readings at home using an automatic blood pressure monitor. Yesterday, her readings ranged from 180/100 to 152/92, and today they were 152 in the left arm and 148 in the right arm.    She reports pain and soreness from a recent procedure last week, which may be affecting her blood pressure readings. She believes the steroids given as part of her recent treatments could be contributing to her elevated blood pressure.    Sara expresses concern about her blood pressure due to her family history. Her mother had a massive stroke at 54 years old and  at 58. Sara, currently 50 years old, is worried about her own risk.    She has a history of kidney  stones but has not had an episode in about 2 or 3 years. When she has kidney stones, she occasionally takes Zofran for nausea.    Sara mentions her inability to complete a colonoscopy in the past due to difficulty with the preparation process. Her father had colon cancer in his 60s, indicating an increased risk for her.    She denies current issues with kidney stones.    MEDICATIONS:  - Ambien  - Zofran, taken as needed for nausea related to kidney stones    FAMILY HISTORY:  - Father: Colon cancer in his 60s  - Mother: Massive stroke at 54 years old,  at 58      ROS:  General: reports diminished activity  Gastrointestinal: reports nausea  Musculoskeletal: reports neck pain, reports back pain         Patient Care Team:  Montrell Magaña MD as PCP - General (Internal Medicine)  Keven Miller MD as Consulting Physician (Physical Medicine and Rehabilitation)      Patient Active Problem List    Diagnosis Date Noted    Lumbar spondylosis 2024    GERD (gastroesophageal reflux disease) 2020    Fibromyalgia 2020    Plantar fasciitis, bilateral 2018    Medial epicondylitis of elbow, right 2018    Biceps tendinitis on right 2018    Left lateral epicondylitis 2018    Early menopause 10/2018 FSH high in the 80s 10/31/2018    Anxiety; sertraline didn't help 10/06/2017    Pedal edema 2017    Smoker 2017 quit smoking 3 months ago       Mixed hyperlipidemia atorva, rosuva SE of foul taste; trial of zetia 2017    Insomnia 10/13/2016     Hx of trazodone without efficacy; Lunesta with SE; mirtazapine with SE; doxepin expensive/ineffective at low dose.         PAST MEDICAL PROBLEMS, PAST SURGICAL HISTORY: please see relevant portions of the electronic medical record    ALLERGIES AND MEDICATIONS: updated and reviewed.  Medication List with Changes/Refills   Current Medications    BACLOFEN (LIORESAL) 20 MG TABLET    TAKE 1 TABLET(20 MG) BY MOUTH THREE  "TIMES DAILY    LIDOCAINE (LIDODERM) 5 %    Place 1 patch onto the skin.    MELOXICAM (MOBIC) 7.5 MG TABLET    Take 1 tablet (7.5 mg total) by mouth once daily.    ONDANSETRON (ZOFRAN-ODT) 4 MG TBDL    DISSOLVE 1 TABLET(4 MG) ON THE TONGUE EVERY 6 HOURS AS NEEDED FOR NAUSEA    ZOLPIDEM (AMBIEN) 10 MG TAB    Take 1 tablet (10 mg total) by mouth nightly as needed.         Objective:   Objective   Physical Exam   Vitals:    06/13/25 1338 06/13/25 1403   BP: (!) 146/88 (!) 150/80   BP Location:  Left arm   Patient Position:  Sitting   Pulse: 92    Temp: 98.6 °F (37 °C)    TempSrc: Oral    SpO2: 98%    Weight: 71 kg (156 lb 8.4 oz)    Height: 5' 2" (1.575 m)     Body mass index is 28.63 kg/m².  Weight: 71 kg (156 lb 8.4 oz)   Height: 5' 2" (157.5 cm)     Physical Exam  Constitutional:       General: She is not in acute distress.     Appearance: She is overweight.   HENT:      Right Ear: Tympanic membrane, ear canal and external ear normal.      Left Ear: Tympanic membrane, ear canal and external ear normal.   Eyes:      General: No scleral icterus.     Extraocular Movements: Extraocular movements intact.      Pupils: Pupils are equal, round, and reactive to light.   Cardiovascular:      Rate and Rhythm: Normal rate and regular rhythm.      Heart sounds: Normal heart sounds. No murmur heard.  Pulmonary:      Effort: Pulmonary effort is normal.      Breath sounds: Normal breath sounds. No wheezing.   Abdominal:      General: There is no distension.      Palpations: Abdomen is soft. There is no hepatomegaly, splenomegaly or mass.      Tenderness: There is no abdominal tenderness. There is no guarding.   Musculoskeletal:         General: No deformity. Normal range of motion.      Cervical back: Neck supple.      Right lower leg: No edema.      Left lower leg: No edema.      Comments: Antalgic gait due to her back, difficulty with transitioning from sitting to lying down on the exam table  No pedal edema   Lymphadenopathy:     "  Cervical: No cervical adenopathy.   Skin:     General: Skin is warm and dry.      Findings: No rash.      Comments: On exposed skin   Neurological:      Mental Status: She is alert and oriented to person, place, and time.      Coordination: Coordination normal.      Deep Tendon Reflexes: Reflexes are normal and symmetric.   Psychiatric:         Behavior: Behavior normal.         Thought Content: Thought content normal.         Judgment: Judgment normal.

## 2025-06-15 ENCOUNTER — RESULTS FOLLOW-UP (OUTPATIENT)
Dept: FAMILY MEDICINE | Facility: CLINIC | Age: 50
End: 2025-06-15
Payer: COMMERCIAL

## 2025-06-15 DIAGNOSIS — E78.2 MIXED HYPERLIPIDEMIA: Primary | ICD-10-CM

## 2025-06-15 DIAGNOSIS — Z12.31 ENCOUNTER FOR SCREENING MAMMOGRAM FOR MALIGNANT NEOPLASM OF BREAST: ICD-10-CM

## 2025-07-03 ENCOUNTER — RESULTS FOLLOW-UP (OUTPATIENT)
Dept: FAMILY MEDICINE | Facility: CLINIC | Age: 50
End: 2025-07-03

## 2025-07-03 ENCOUNTER — LAB VISIT (OUTPATIENT)
Dept: LAB | Facility: HOSPITAL | Age: 50
End: 2025-07-03
Attending: INTERNAL MEDICINE
Payer: COMMERCIAL

## 2025-07-03 ENCOUNTER — PATIENT MESSAGE (OUTPATIENT)
Dept: FAMILY MEDICINE | Facility: CLINIC | Age: 50
End: 2025-07-03
Payer: COMMERCIAL

## 2025-07-03 DIAGNOSIS — I10 ESSENTIAL HYPERTENSION: Primary | ICD-10-CM

## 2025-07-03 DIAGNOSIS — Z12.11 SCREENING FOR COLORECTAL CANCER: ICD-10-CM

## 2025-07-03 DIAGNOSIS — Z12.12 SCREENING FOR COLORECTAL CANCER: ICD-10-CM

## 2025-07-03 LAB — OB PNL STL IA: NEGATIVE

## 2025-07-03 PROCEDURE — 82274 ASSAY TEST FOR BLOOD FECAL: CPT

## 2025-07-05 DIAGNOSIS — G47.00 INSOMNIA, UNSPECIFIED TYPE: Chronic | ICD-10-CM

## 2025-07-05 NOTE — TELEPHONE ENCOUNTER
No care due was identified.  [facility] Embedded Care Due Messages. Reference number: [id number].   7/05/2025 9:42:26 AM CDT   Negative

## 2025-07-06 RX ORDER — ZOLPIDEM TARTRATE 10 MG/1
10 TABLET ORAL NIGHTLY PRN
Qty: 30 TABLET | Refills: 2 | Status: SHIPPED | OUTPATIENT
Start: 2025-07-06

## 2025-07-08 VITALS — DIASTOLIC BLOOD PRESSURE: 93 MMHG | SYSTOLIC BLOOD PRESSURE: 158 MMHG

## 2025-07-08 DIAGNOSIS — I10 ESSENTIAL HYPERTENSION: ICD-10-CM

## 2025-07-08 RX ORDER — OLMESARTAN MEDOXOMIL 20 MG/1
TABLET ORAL
Qty: 90 TABLET | OUTPATIENT
Start: 2025-07-08

## 2025-07-08 RX ORDER — OLMESARTAN MEDOXOMIL 20 MG/1
20 TABLET ORAL DAILY
Qty: 30 TABLET | Refills: 0 | Status: SHIPPED | OUTPATIENT
Start: 2025-07-08 | End: 2026-07-08

## 2025-07-08 NOTE — TELEPHONE ENCOUNTER
No care due was identified.  [facility] Embedded Care Due Messages. Reference number: [id number].   7/08/2025 9:57:53 AM CDT

## 2025-07-08 NOTE — TELEPHONE ENCOUNTER
Will start olmesartan    Please schedule pt for nurse visit 2 weeks for BP check  And lab visit that day for BMP, ordered.

## 2025-07-08 NOTE — TELEPHONE ENCOUNTER
Refill Decision Note   Sara Thapa  is requesting a refill authorization.  Brief Assessment and Rationale for Refill:  Quick Discontinue     Medication Therapy Plan:        Comments:     Note composed:1:01 PM 07/08/2025

## 2025-07-16 DIAGNOSIS — Z12.31 OTHER SCREENING MAMMOGRAM: ICD-10-CM

## 2025-07-18 ENCOUNTER — PATIENT MESSAGE (OUTPATIENT)
Dept: FAMILY MEDICINE | Facility: CLINIC | Age: 50
End: 2025-07-18
Payer: COMMERCIAL

## 2025-07-22 ENCOUNTER — PATIENT MESSAGE (OUTPATIENT)
Dept: ADMINISTRATIVE | Facility: HOSPITAL | Age: 50
End: 2025-07-22
Payer: COMMERCIAL

## 2025-07-30 ENCOUNTER — LAB VISIT (OUTPATIENT)
Dept: LAB | Facility: HOSPITAL | Age: 50
End: 2025-07-30
Attending: INTERNAL MEDICINE
Payer: COMMERCIAL

## 2025-07-30 ENCOUNTER — CLINICAL SUPPORT (OUTPATIENT)
Dept: FAMILY MEDICINE | Facility: CLINIC | Age: 50
End: 2025-07-30
Payer: COMMERCIAL

## 2025-07-30 VITALS — SYSTOLIC BLOOD PRESSURE: 128 MMHG | DIASTOLIC BLOOD PRESSURE: 82 MMHG

## 2025-07-30 DIAGNOSIS — I10 ESSENTIAL HYPERTENSION: ICD-10-CM

## 2025-07-30 DIAGNOSIS — R03.0 ELEVATED BLOOD PRESSURE READING: Primary | ICD-10-CM

## 2025-07-30 LAB
ANION GAP (OHS): 8 MMOL/L (ref 8–16)
BUN SERPL-MCNC: 19 MG/DL (ref 6–20)
CALCIUM SERPL-MCNC: 9.5 MG/DL (ref 8.7–10.5)
CHLORIDE SERPL-SCNC: 107 MMOL/L (ref 95–110)
CO2 SERPL-SCNC: 26 MMOL/L (ref 23–29)
CREAT SERPL-MCNC: 0.9 MG/DL (ref 0.5–1.4)
GFR SERPLBLD CREATININE-BSD FMLA CKD-EPI: >60 ML/MIN/1.73/M2
GLUCOSE SERPL-MCNC: 115 MG/DL (ref 70–110)
POTASSIUM SERPL-SCNC: 4.5 MMOL/L (ref 3.5–5.1)
SODIUM SERPL-SCNC: 141 MMOL/L (ref 136–145)

## 2025-07-30 PROCEDURE — 99999 PR PBB SHADOW E&M-EST. PATIENT-LVL I: CPT | Mod: PBBFAC,,,

## 2025-07-30 PROCEDURE — 36415 COLL VENOUS BLD VENIPUNCTURE: CPT | Mod: PO

## 2025-07-30 PROCEDURE — 80048 BASIC METABOLIC PNL TOTAL CA: CPT

## 2025-07-30 NOTE — PROGRESS NOTES
Sara Thapa 50 y.o. female is here today for Blood Pressure check.   History of HTN yes.    Review of patient's allergies indicates:   Allergen Reactions    Codeine Hives and Itching     Other reaction(s): Drug Allergy     Creatinine   Date Value Ref Range Status   06/13/2025 0.8 0.5 - 1.4 mg/dL Final     Sodium   Date Value Ref Range Status   06/13/2025 142 136 - 145 mmol/L Final   04/09/2024 139 136 - 145 mmol/L Final     Potassium   Date Value Ref Range Status   06/13/2025 3.9 3.5 - 5.1 mmol/L Final   04/09/2024 4.9 3.5 - 5.1 mmol/L Final   ]  Patient verifies taking blood pressure medications on a regular basis at the same time of the day.   Current Medications[1]  Does patient have record of home blood pressure readings yes. Readings have been averaging 146/87.   Last dose of blood pressure medication was taken at 7am.  Patient is asymptomatic.   No complaints        Dr. Magaña notified.           [1]   Current Outpatient Medications:     baclofen (LIORESAL) 20 MG tablet, TAKE 1 TABLET(20 MG) BY MOUTH THREE TIMES DAILY, Disp: 90 tablet, Rfl: 2    LIDOcaine (LIDODERM) 5 %, Place 1 patch onto the skin., Disp: , Rfl:     meloxicam (MOBIC) 7.5 MG tablet, Take 1 tablet (7.5 mg total) by mouth once daily. (Patient not taking: Reported on 6/13/2025), Disp: 30 tablet, Rfl: 2    olmesartan (BENICAR) 20 MG tablet, Take 1 tablet (20 mg total) by mouth once daily., Disp: 30 tablet, Rfl: 0    ondansetron (ZOFRAN-ODT) 4 MG TbDL, DISSOLVE 1 TABLET(4 MG) ON THE TONGUE EVERY 6 HOURS AS NEEDED FOR NAUSEA, Disp: 30 tablet, Rfl: 0    zolpidem (AMBIEN) 10 mg Tab, Take 1 tablet (10 mg total) by mouth nightly as needed., Disp: 30 tablet, Rfl: 2

## 2025-08-04 DIAGNOSIS — I10 ESSENTIAL HYPERTENSION: ICD-10-CM

## 2025-08-04 NOTE — TELEPHONE ENCOUNTER
No care due was identified.  [facility] Embedded Care Due Messages. Reference number: [id number].   8/04/2025 3:53:28 AM CDT

## 2025-08-05 ENCOUNTER — PATIENT MESSAGE (OUTPATIENT)
Dept: FAMILY MEDICINE | Facility: CLINIC | Age: 50
End: 2025-08-05
Payer: COMMERCIAL

## 2025-08-05 NOTE — TELEPHONE ENCOUNTER
Refill Routing Note   Medication(s) are not appropriate for processing by [facility] for the following reason(s):        New or recently adjusted medication    [facility] action(s):  Defer             Appointments  past 12m or future 3m with PCP    Date Provider   Last Visit   6/13/2025 Montrell Magaña MD   Next Visit   Visit date not found Montrell Magaña MD   ED visits in past 90 days: 0        Note composed:9:31 PM 08/04/2025

## 2025-08-06 RX ORDER — OLMESARTAN MEDOXOMIL 20 MG/1
20 TABLET ORAL DAILY
Qty: 90 TABLET | Refills: 1 | Status: SHIPPED | OUTPATIENT
Start: 2025-08-06